# Patient Record
Sex: FEMALE
[De-identification: names, ages, dates, MRNs, and addresses within clinical notes are randomized per-mention and may not be internally consistent; named-entity substitution may affect disease eponyms.]

---

## 2018-03-14 ENCOUNTER — HOSPITAL ENCOUNTER (EMERGENCY)
Dept: HOSPITAL 5 - ED | Age: 48
Discharge: HOME | End: 2018-03-14
Payer: SELF-PAY

## 2018-03-14 VITALS — SYSTOLIC BLOOD PRESSURE: 112 MMHG | DIASTOLIC BLOOD PRESSURE: 73 MMHG

## 2018-03-14 DIAGNOSIS — J06.9: ICD-10-CM

## 2018-03-14 DIAGNOSIS — I10: ICD-10-CM

## 2018-03-14 DIAGNOSIS — J11.1: Primary | ICD-10-CM

## 2018-03-14 PROCEDURE — 99283 EMERGENCY DEPT VISIT LOW MDM: CPT

## 2018-03-14 PROCEDURE — 71046 X-RAY EXAM CHEST 2 VIEWS: CPT

## 2018-03-14 NOTE — EMERGENCY DEPARTMENT REPORT
- General


Chief Complaint: Upper Respiratory Infection


Stated Complaint: JACOB


Time Seen by Provider: 03/14/18 12:37


Source: patient


Mode of arrival: Ambulatory


Limitations: No Limitations





- History of Present Illness


Initial Comments: 





This is a 47-year-old female nontoxic, well nourished in appearance, no acute 

signs of distress presents to the ED with c/o of productive cough, fever, chills

, body aches, rhinorrhea, nasal congestion, and left earache x1 day.  Patient 

describes productive cough as yellow mucus production.  Patient denies any sick 

contact.  Patient denies any recent travels, long car, recent hospital stays.  

Patient denies any calf pain or calf tenderness.  Patient denies any chest pain

, short of breath, fever, chills, nausea, vomiting, hemoptysis, numbness, 

tingling, headache or stiff neck.  Patient denies any allergies or significant 

PMH. 


MD Complaint: cough, rhinorrhea, nasal congestion, other (body aches)


-: days(s) (1)


Severity: mild


Severity scale (0 -10): 8


Quality: aching


Consistency: constant


Improves With: nothing


Worsens With: nothing


Associated Symptoms: rhinorrhea, nasal congestion, cough.  denies: fever, chills

, myalgias, diaphoresis, headache, sore throat, stiff neck, chest pain, 

shortness of breath, abdominal pain, nausea, vomiting, diarrhea, dysuria, rash, 

confusion, right sweats, weight loss, epistaxis, hoarseness, ear pain


Treatments Prior to Arrival: none





- Related Data


 Previous Rx's











 Medication  Instructions  Recorded  Last Taken  Type


 


ALBUTEROL Inhaler [Proair] 2 puff IH QID PRN #1 inhalation 03/14/18 Unknown Rx


 


Azithromycin [Zithromax Z-TAYLOR] 250 mg PO DAILY #6 tablet 03/14/18 Unknown Rx


 


Benzonatate [Tessalon Perle] 100 mg PO Q8H PRN #20 capsule 03/14/18 Unknown Rx


 


Ibuprofen [Motrin] 600 mg PO Q8H PRN #30 tablet 03/14/18 Unknown Rx


 


Oseltamivir [Tamiflu] 75 mg PO BID #14 cap 03/14/18 Unknown Rx


 


amLODIPine [Norvasc] 5 mg PO DAILY #30 tab 03/14/18 Unknown Rx











 Allergies











Allergy/AdvReac Type Severity Reaction Status Date / Time


 


No Known Allergies Allergy   Unverified 03/14/18 11:08














ED Review of Systems


ROS: 


Stated complaint: JACOB


Other details as noted in HPI





Constitutional: denies: chills, fever


Eyes: denies: eye pain, eye discharge, vision change


ENT: denies: ear pain, throat pain


Respiratory: cough.  denies: shortness of breath, wheezing


Cardiovascular: denies: chest pain, palpitations


Endocrine: no symptoms reported


Gastrointestinal: denies: abdominal pain, nausea, diarrhea


Genitourinary: denies: urgency, dysuria, discharge


Musculoskeletal: denies: back pain, joint swelling, arthralgia


Skin: denies: rash, lesions


Neurological: denies: headache, weakness, paresthesias


Psychiatric: denies: anxiety, depression


Hematological/Lymphatic: denies: easy bleeding, easy bruising





ED Past Medical Hx





- Past Medical History


Hx Asthma: Yes


Additional medical history: Bronchitis





- Surgical History


Additional Surgical History: Tonsilectomy





- Social History


Smoking Status: Never Smoker


Substance Use Type: Alcohol





- Medications


Home Medications: 


 Home Medications











 Medication  Instructions  Recorded  Confirmed  Last Taken  Type


 


ALBUTEROL Inhaler [Proair] 2 puff IH QID PRN #1 inhalation 03/14/18  Unknown Rx


 


Azithromycin [Zithromax Z-TAYLOR] 250 mg PO DAILY #6 tablet 03/14/18  Unknown Rx


 


Benzonatate [Tessalon Perle] 100 mg PO Q8H PRN #20 capsule 03/14/18  Unknown Rx


 


Ibuprofen [Motrin] 600 mg PO Q8H PRN #30 tablet 03/14/18  Unknown Rx


 


Oseltamivir [Tamiflu] 75 mg PO BID #14 cap 03/14/18  Unknown Rx


 


amLODIPine [Norvasc] 5 mg PO DAILY #30 tab 03/14/18  Unknown Rx














ED Physical Exam





- General


Limitations: No Limitations


General appearance: alert, in no apparent distress





- Head


Head exam: Present: atraumatic, normocephalic





- Eye


Eye exam: Present: normal appearance, PERRL, EOMI


Pupils: Present: normal accommodation





- ENT


ENT exam: Present: normal exam, normal orophraynx, mucous membranes moist, TM's 

normal bilaterally, normal external ear exam





- Neck


Neck exam: Present: normal inspection, full ROM.  Absent: tenderness, 

meningismus, lymphadenopathy, thyromegaly





- Respiratory


Respiratory exam: Present: normal lung sounds bilaterally.  Absent: respiratory 

distress, wheezes, rales, rhonchi, stridor, chest wall tenderness, accessory 

muscle use, decreased breath sounds, prolonged expiratory





- Cardiovascular


Cardiovascular Exam: Present: regular rate, normal rhythm, normal heart sounds.

  Absent: irregular rhythm, systolic murmur, diastolic murmur, rubs, gallop





- GI/Abdominal


GI/Abdominal exam: Present: soft, normal bowel sounds.  Absent: distended, 

tenderness, guarding, rebound, rigid, diminished bowel sounds





- Rectal


Rectal exam: Present: deferred





- Extremities Exam


Extremities exam: Present: normal inspection, full ROM, normal capillary 

refill.  Absent: tenderness, pedal edema, joint swelling, calf tenderness





- Back Exam


Back exam: Present: normal inspection, full ROM.  Absent: tenderness, CVA 

tenderness (R), CVA tenderness (L), muscle spasm, paraspinal tenderness, 

vertebral tenderness, rash noted





- Neurological Exam


Neurological exam: Present: alert, oriented X3, CN II-XII intact, normal gait, 

reflexes normal





- Psychiatric


Psychiatric exam: Present: normal affect, normal mood





- Skin


Skin exam: Present: warm, dry, intact, normal color.  Absent: rash





ED Course


 Vital Signs











  03/14/18 03/14/18





  11:08 14:33


 


Temperature 98.2 F 


 


Pulse Rate 95 H 98 H


 


Respiratory 16 20





Rate  


 


Blood Pressure 178/105 


 


Blood Pressure  112/73





[Left]  


 


O2 Sat by Pulse 99 100





Oximetry  














- Reevaluation(s)


Reevaluation #1: 





03/14/18 14:16


Patient is speaking in full sentences with no signs of distress noted.





- Consultations


Consultation #1: 





03/14/18 14:16


Patient has been consulted with Dr. Terrell about patient history, physical 

exam, and labs and examined and screened patient and agrees to ED plan of care 

and discharge plan of care. 





ED Medical Decision Making





- Medical Decision Making





This is a 47-year-old female that presents with upper respiratory infection, HTN

, and influenza.  Patient is stable and was examined by me.  Chest x-ray has 

been obtained and dictated by radiologist with normal exam.  Patient is 

notified of x-ray results with no questions noted. Due to patient having 

symptoms of upper respiratory infection and symptoms of influenza and worsening 

I will treat patient empirically Tamiflu with zpak.  Patient is within the >72 

hour window for tamiflu.  Patient was instructed to increase hydration, rest 

and take Motrin for fever episodes.  Patient received Catapres in the ED.  

Vitals stable. Patient is nonfebrile and normal heart rate. Patient was orally 

hydrated and patient tolerated well known nausea or vomiting.  Patient was 

instructed Follow-up with a primary care doctor in 3-5 days or if symptoms 

worsen and continue return to emergency room as soon as possible.  At time time 

of discharge, the patient does not seem toxic or ill in appearance.  No acute 

signs of distress noted.  Patient agrees to discharge treatment plan of care.  

No further questions noted by the patient.


Critical care attestation.: 


If time is entered above; I have spent that time in minutes in the direct care 

of this critically ill patient, excluding procedure time.








ED Disposition


Clinical Impression: 


 Influenza





HTN (hypertension)


Qualifiers:


 Hypertension type: unspecified Qualified Code(s): I10 - Essential (primary) 

hypertension





Upper respiratory infection


Qualifiers:


 URI type: unspecified URI Qualified Code(s): J06.9 - Acute upper respiratory 

infection, unspecified





Disposition: DC-01 TO HOME OR SELFCARE


Is pt being admited?: No


Does the pt Need Aspirin: No


Condition: Stable


Instructions:  Azithromycin (By mouth), Oseltamivir (By mouth), Influenza (ED), 

Upper Respiratory Infection (ED), Hypertension (ED)


Additional Instructions: 


Follow-up with a primary care doctor in 3-5 days or if symptoms worsen and 

continue return to emergency room as soon as possible. 


Increase rest, hydration and take Motrin as prescribed during fever episode.


Prescriptions: 


ALBUTEROL Inhaler [Proair] 2 puff IH QID PRN #1 inhalation


 PRN Reason: Shortness Of Breath


amLODIPine [Norvasc] 5 mg PO DAILY #30 tab


Azithromycin [Zithromax Z-TAYLOR] 250 mg PO DAILY #6 tablet


Benzonatate [Tessalon Perle] 100 mg PO Q8H PRN #20 capsule


 PRN Reason: Cough


Ibuprofen [Motrin] 600 mg PO Q8H PRN #30 tablet


 PRN Reason: Pain


Oseltamivir [Tamiflu] 75 mg PO BID #14 cap


Referrals: 


PRIMARY CARE,MD [Primary Care Provider] - 3-5 Days


PREM BEE MD [Staff Physician] - 3-5 Days


[facility] [Outside] - 3-5 Days


[facility] [Outside] - 3-5 Days


Forms:  Work/School Release Form(ED)

## 2018-03-14 NOTE — EMERGENCY DEPARTMENT REPORT
Chief Complaint: Upper Respiratory Infection


Stated Complaint: JACOB


Time Seen by Provider: 03/14/18 12:37





- HPI


History of Present Illness: 





The patient is a 47-year-old female whom presents for evaluation of cough.  The 

patient has history of asthma and COPD.  The patient reports a productive cough 

for the past 24 hours, associated with 1 day of mild stinging in quality 

soreness of the throat, exacerbated with swallowing, and left ear pain, 

fullness in quality, mild in severity.  The patient denies fever, chest pain, 

dyspnea,  neck stiffness, dysphagia, stridor, drooling, difficulty tolerating 

secretions, dysphonia, hoarseness of voice,  abdominal pain.





- Exam


Vital Signs: 


 Vital Signs











  03/14/18





  11:08


 


Temperature 98.2 F


 


Pulse Rate 95 H


 


Respiratory 16





Rate 


 


Blood Pressure 178/105


 


O2 Sat by Pulse 99





Oximetry 











MSE screening note: 


Focused history and physical exam performed.


Due to findings the following was ordered:











ED Disposition for MSE


Condition: Stable


Referrals: 


PRIMARY CARE,MD [Primary Care Provider] - 3-5 Days

## 2018-03-14 NOTE — XRAY REPORT
Chest 2 views:



History: Dyspnea.



Findings:



Normal cardiomediastinal silhouette the trachea is midline. No 

consolidation, pneumothorax or pleural effusion.



Impression:



No acute cardiopulmonary findings.

## 2020-12-30 ENCOUNTER — HOSPITAL ENCOUNTER (OUTPATIENT)
Dept: HOSPITAL 5 - XRAY | Age: 50
Discharge: HOME | End: 2020-12-30
Attending: INTERNAL MEDICINE
Payer: COMMERCIAL

## 2020-12-30 DIAGNOSIS — J45.909: Primary | ICD-10-CM

## 2020-12-30 DIAGNOSIS — R05: ICD-10-CM

## 2020-12-30 DIAGNOSIS — I10: ICD-10-CM

## 2020-12-30 DIAGNOSIS — J30.9: ICD-10-CM

## 2020-12-30 LAB
ALBUMIN SERPL-MCNC: 4.4 G/DL (ref 3.9–5)
ALT SERPL-CCNC: 108 UNITS/L (ref 7–56)
BUN SERPL-MCNC: 13 MG/DL (ref 7–17)
BUN/CREAT SERPL: 12 %
CALCIUM SERPL-MCNC: 9.7 MG/DL (ref 8.4–10.2)
HCO3 BLDA-SCNC: 24.5 MMOL/L (ref 20–26)
HCT VFR BLD CALC: 39.3 % (ref 30.3–42.9)
HEMOLYSIS INDEX: 3
HGB BLD-MCNC: 13.3 GM/DL (ref 10.1–14.3)
MCHC RBC AUTO-ENTMCNC: 34 % (ref 30–34)
MCV RBC AUTO: 102 FL (ref 79–97)
PCO2 BLDA: 37.4 MM HG
PH BLDA: 7.43 PH UNITS (ref 7.35–7.45)
PLATELET # BLD: 263 K/MM3 (ref 140–440)
PO2 BLDA: 77.2 MM HG (ref 80–90)
RBC # BLD AUTO: 3.87 M/MM3 (ref 3.65–5.03)

## 2020-12-30 PROCEDURE — 70220 X-RAY EXAM OF SINUSES: CPT

## 2020-12-30 PROCEDURE — 84436 ASSAY OF TOTAL THYROXINE: CPT

## 2020-12-30 PROCEDURE — 82785 ASSAY OF IGE: CPT

## 2020-12-30 PROCEDURE — 36415 COLL VENOUS BLD VENIPUNCTURE: CPT

## 2020-12-30 PROCEDURE — 80053 COMPREHEN METABOLIC PANEL: CPT

## 2020-12-30 PROCEDURE — 84443 ASSAY THYROID STIM HORMONE: CPT

## 2020-12-30 PROCEDURE — 82803 BLOOD GASES ANY COMBINATION: CPT

## 2020-12-30 PROCEDURE — 85027 COMPLETE CBC AUTOMATED: CPT

## 2020-12-30 NOTE — XRAY REPORT
PARANASAL SINUSES 4 VIEW(S)



INDICATION / CLINICAL INFORMATION:

SINUSITIS.



COMPARISON: 

None available.



FINDINGS:

SINUSES: No air-fluid level. Mild mucosal thickening in the ethmoid sinuses. 



ADDITIONAL FINDINGS: None.



IMPRESSION:

1. No evidence of acute sinusitis.



Signer Name: Mahtew Calzada MD 

Signed: 12/30/2020 3:21 PM

Workstation Name: [id number]